# Patient Record
Sex: MALE | Race: ASIAN | Employment: STUDENT | URBAN - METROPOLITAN AREA
[De-identification: names, ages, dates, MRNs, and addresses within clinical notes are randomized per-mention and may not be internally consistent; named-entity substitution may affect disease eponyms.]

---

## 2017-01-06 ENCOUNTER — OFFICE VISIT (OUTPATIENT)
Dept: NEUROLOGY | Facility: CLINIC | Age: 32
End: 2017-01-06

## 2017-01-06 VITALS
SYSTOLIC BLOOD PRESSURE: 120 MMHG | DIASTOLIC BLOOD PRESSURE: 72 MMHG | HEART RATE: 56 BPM | WEIGHT: 141.8 LBS | HEIGHT: 68 IN | BODY MASS INDEX: 21.49 KG/M2

## 2017-01-06 DIAGNOSIS — G40.919 INTRACTABLE SEIZURES (H): ICD-10-CM

## 2017-01-06 DIAGNOSIS — G40.919 INTRACTABLE SEIZURES (H): Primary | ICD-10-CM

## 2017-01-06 RX ORDER — CARBAMAZEPINE 200 MG/1
400 TABLET, EXTENDED RELEASE ORAL 2 TIMES DAILY
Qty: 360 TABLET | Refills: 3 | Status: SHIPPED | OUTPATIENT
Start: 2017-01-06

## 2017-01-06 RX ORDER — LEVETIRACETAM 500 MG/1
500 TABLET ORAL 2 TIMES DAILY
Qty: 180 TABLET | Refills: 3 | Status: SHIPPED | OUTPATIENT
Start: 2017-01-06

## 2017-01-06 RX ORDER — LEVETIRACETAM 500 MG/1
1000 TABLET ORAL 2 TIMES DAILY
Qty: 360 TABLET | Refills: 3 | Status: SHIPPED | OUTPATIENT
Start: 2017-01-06 | End: 2017-01-06

## 2017-01-06 ASSESSMENT — PAIN SCALES - GENERAL: PAINLEVEL: NO PAIN (0)

## 2017-01-06 NOTE — PROGRESS NOTES
"CHIEF COMPLAINT:  Seizure like activities.        HISTORY OF PRESENT ILLNESS:  This patient is a 31-year-old right-handed, French male returns for follow up for partial seizures.  The patient was diagnosed with epilepsy when he was in [place].  He is accompanied by a French  in the clinic today.  Since the last visit about 5 months ago, he continue to have simple partial seizures with feeling \"goose bumps\" on his body, several times a week, but no LOC or black out spells.  He ran out of the tegretol recently.  So he started to take his tegretol he got from [place].  He felt that the \"[place] tegretol\" does not work as well as the \" tegretol\".  He would like to continue US tegretol.  He had VEEG monitoring in May 2016.  \"EEG study was abnormal due to the presence of occasional poorly formed left posterior quadrant epileptiform discharges with maximal electronegativity at O1 and several electrographic seizures with left posterior quadrant maximum were recorded.  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  In addition, patient had several paroxysmal behavioral events of feeling strange and  goose bumps  without clear EEG correlate to diagnose epileptic seizures; these may have represented simple partial seizures without scalp EEG alterations.\"       According to the patient, he started to have his habitual spells when he was 12 years old.  He has only 1 type of spell.  These spells are described as he will have a feeling of something is going to happen.  Then he will have goosebumps all over his body, then he will breathe deeply in and out.  He denies chest pain, denies palpitations or any other symptoms.  He did have 2 occasions that he lost awareness years ago when he was in [place].  These spells last usually from 30 seconds to 2 minutes.  They mostly happened in the early morning when he just woke up.  Now they are happening almost daily.  He is currently taking " "Tegretol 400 mg twice daily.  He has been on Tegretol for almost 7 years, but he felt that Tegretol was not really helping.  He also tried other medications in the past including Keppra, Depakote, Trileptal.  He felt that none of the medications had helped.      He was evaluated in [place] in the past.  He had an MRI scan which showed no abnormalities.  He also had EEG study which showed the EEG captured multiple seizures with tachycardia with lip smacking and partial responsiveness.  Unfortunately, this was done in [place] and we do not have the actual EEG recording for review at this time.  There was 1 report mentioned that his brain MRI was suspicious for right hippocampal atrophy.  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      Triggers are possibly stress and seizures became more frequent since he came to the  for study about 4 years ago.      Risk factors: None.    Current Outpatient Prescriptions   Medication Sig Dispense Refill     levETIRAcetam (KEPPRA) 500 MG tablet Take 2 tablets (1,000 mg) by mouth 2 times daily 360 tablet 3     carBAMazepine (TEGRETOL XR) 200 MG 12 hr tablet Take 2 tablets (400 mg) by mouth 2 times daily 360 tablet 3        PREVIOUS ANTIEPILEPTIC MEDICATIONS:  Keppra, Depakote and Trileptal.       PAST MEDICAL HISTORY:  Perirectal abscess with surgical drainage.      FAMILY HISTORY:  No family history of seizures.      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  He just graduated.  He smokes 1 pack of cigarettes every 4 days.  Drinks alcohol occasionally, about once a week, 2-3 beers each time.  No drug abuse.  He is from [place].      ALLERGIES:  No known drug allergies.      REVIEW OF SYSTEMS:  A 10-point review of systems is essentially negative except for mentioned in HPI.      PHYSICAL EXAMINATION:      Blood pressure 120/72, pulse 56, height 1.72 m (5' 7.7\"), weight 64.32 kg (141 lb 12.8 " "oz).    General exam: General Appearance: No acute distress. HEENT: Normocephalic, atraumatic. Neck: Supple.  Extremities: No edema, no clubbing, no cyanosis.     Neurologic Exam: Alert and oriented x3. Speech fluent, appropriate. Normal attention. Cranial Nerves: Pupils are equal, round, reactive to light and accomodation. Extraocular movement intact. No facial weakness or asymmetry. Hearing normal. Motor Exam: Normal. Coordination:no ataxia.  Gait and Station: normal.       PREVIOUS DIAGNOSTIC TESTING:  He had an MRI scan in the past in [place] which was reported negative.  However, there were some comments of the possibility of possible right hippocampal sclerosis.      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      IMPRESSION:   1.  Epilepsy.  This patient is a 31-year-old right-handed, Greek male returns for follow up for partial seizures.  The patient was diagnosed with epilepsy when he was in [place].  He is accompanied by a Greek  in the clinic today.  Since the last visit about 5 months ago, he continue to have simple partial seizures with feeling \"goose bumps\" on his body, several times a week, but no LOC or black out spells.  He ran out of the tegretol recently.  So he started to take his tegretol he got from [place].  He felt that the \"[place] tegretol\" does not work as well as the \" tegretol\".  He would like to continue US tegretol.  He had VEEG monitoring in May 2016.  \"EEG study was abnormal due to the presence of occasional poorly formed left posterior quadrant epileptiform discharges with maximal electronegativity at O1 and several electrographic seizures with left posterior quadrant maximum were recorded.  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  In addition, patient had several paroxysmal behavioral events of feeling strange and  goose bumps  without clear EEG correlate to " "diagnose epileptic seizures; these may have represented simple partial seizures without scalp EEG alterations.\"       2. Insomnia.  Improved.     PLAN:   1. Continue Tegretol 400 mg twice daily and Keppra 1000mg bid.   2. Tegretol and keppra levels.  3. Return to clinic in 6 months.    40 min was spent on the visit.  Over 50% of the time was spent on education, counseling about optimal seizure control and coordination of care.    "

## 2017-01-06 NOTE — MR AVS SNAPSHOT
After Visit Summary   1/6/2017    Jeff Wesley    MRN: [id number]           Patient Information     Date Of Birth          1985        Visit Information        Provider Department      1/6/2017 10:15 AM Mark Marrero MD; [employer] [facility]        Today's Diagnoses     Intractable seizures (H)    -  1        Follow-ups after your visit        Follow-up notes from your care team     Return in about 6 months (around 7/6/2017).      Your next 10 appointments already scheduled     Jan 06, 2017 11:45 AM   LAB with  LAB   [facility])    [address] [place] [id number] [phone]           Patient must bring picture ID.  Patient should be prepared to give a urine specimen  Please do not eat 10-12 hours before your appointment if you are coming in fasting for labs on lipids, cholesterol, or glucose (sugar).  Pregnant women should follow their Care Team instructions. Water with medications is okay. Do not drink coffee or other fluids.   If you have concerns about taking  your medications, please ask at office or if scheduling via [facility], send a message by clicking on Secure Messaging, Message Your Care Team.            Jul 12, 2017  3:20 PM   (Arrive by 3:05 PM)   Return Seizure with Mark Marrero MD   [facility] ([facility])    [address] [place] [id number] [phone]              Future tests that were ordered for you today     Open Future Orders        Priority Expected Expires Ordered    Carbamazepine and epoxide free and total Routine  1/6/2018 1/6/2017    Levetiracetam level Routine  1/6/2018 1/6/2017            Who to contact     Please call your clinic at [phone] to:    Ask questions about your health    Make or cancel appointments    Discuss your medicines    Learn about your test results    Speak to your doctor   If you have  "compliments or concerns about an experience at your clinic, or if you wish to file a complaint, please contact [facility] Patient Relations at [phone] or email us at [email or web address]         Additional Information About Your Visit        MyChart Information     [employer] gives you secure access to your electronic health record. If you see a primary care provider, you can also send messages to your care team and make appointments. If you have questions, please call your primary care clinic.  If you do not have a primary care provider, please call [phone] and they will assist you.      [employer] is an electronic gateway that provides easy, online access to your medical records. With [employer], you can request a clinic appointment, read your test results, renew a prescription or communicate with your care team.     To access your existing account, please contact your [facility] or call [phone] for assistance.        Care EveryWhere ID     This is your Care EveryWhere ID. This could be used by other organizations to access your [place] medical records  [id number]        Your Vitals Were     Pulse Height BMI (Body Mass Index)             56 1.72 m (5' 7.7\") 21.74 kg/m2          Blood Pressure from Last 3 Encounters:   01/06/17 120/72   08/05/16 107/65   07/08/16 108/68    Weight from Last 3 Encounters:   01/06/17 64.32 kg (141 lb 12.8 oz)   08/05/16 62.143 kg (137 lb)   04/15/16 63.685 kg (140 lb 6.4 oz)                 Where to get your medicines      These medications were sent to [facility] [place], [place] - [address] [facility] [address]  [address] [place] [address] [address] [address]    Hours:  TRANSPLANT PHONE NUMBER [phone] Phone:  [phone]    - carBAMazepine 200 MG 12 hr tablet  - levETIRAcetam 500 MG tablet       Primary Care Provider Office Phone # Fax #    Freddie Schmitt [phone] " [phone]       [address]  [facility] [address]        Thank you!     Thank you for choosing [facility]  for your care. Our goal is always to provide you with excellent care. Hearing back from our patients is one way we can continue to improve our services. Please take a few minutes to complete the written survey that you may receive in the mail after your visit with us. Thank you!             Your Updated Medication List - Protect others around you: Learn how to safely use, store and throw away your medicines at www.disposemymeds.org.          This list is accurate as of: 1/6/17 11:17 AM.  Always use your most recent med list.                   Brand Name Dispense Instructions for use    carBAMazepine 200 MG 12 hr tablet    TEGretol XR    360 tablet    Take 2 tablets (400 mg) by mouth 2 times daily       levETIRAcetam 500 MG tablet    KEPPRA    360 tablet    Take 2 tablets (1,000 mg) by mouth 2 times daily

## 2017-01-06 NOTE — Clinical Note
"1/6/2017       RE: Jeff Wesley  [address]  [facility] [address]     Dear Colleague,    Thank you for referring your patient, Jeff Wesley, to the [facility] at [facility]. Please see a copy of my visit note below.    CHIEF COMPLAINT:  Seizure like activities.        HISTORY OF PRESENT ILLNESS:  This patient is a 31-year-old right-handed, Occitan male returns for follow up for partial seizures.  The patient was diagnosed with epilepsy when he was in [place].  He is accompanied by a Occitan  in the clinic today.  Since the last visit about 5 months ago, he continue to have simple partial seizures with feeling \"goose bumps\" on his body, several times a week, but no LOC or black out spells.  He ran out of the tegretol recently.  So he started to take his tegretol he got from [place].  He felt that the \"[place] tegretol\" does not work as well as the \" tegretol\".  He would like to continue US tegretol.  He had VEEG monitoring in May 2016.  \"EEG study was abnormal due to the presence of occasional poorly formed left posterior quadrant epileptiform discharges with maximal electronegativity at O1 and several electrographic seizures with left posterior quadrant maximum were recorded.  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  In addition, patient had several paroxysmal behavioral events of feeling strange and  goose bumps  without clear EEG correlate to diagnose epileptic seizures; these may have represented simple partial seizures without scalp EEG alterations.\"       According to the patient, he started to have his habitual spells when he was 12 years old.  He has only 1 type of spell.  These spells are described as he will have a feeling of something is going to happen.  Then he will have goosebumps all over his body, then he will breathe deeply in and out.  He denies chest pain, denies palpitations or any other " symptoms.  He did have 2 occasions that he lost awareness years ago when he was in [place].  These spells last usually from 30 seconds to 2 minutes.  They mostly happened in the early morning when he just woke up.  Now they are happening almost daily.  He is currently taking Tegretol 400 mg twice daily.  He has been on Tegretol for almost 7 years, but he felt that Tegretol was not really helping.  He also tried other medications in the past including Keppra, Depakote, Trileptal.  He felt that none of the medications had helped.      He was evaluated in [place] in the past.  He had an MRI scan which showed no abnormalities.  He also had EEG study which showed the EEG captured multiple seizures with tachycardia with lip smacking and partial responsiveness.  Unfortunately, this was done in [place] and we do not have the actual EEG recording for review at this time.  There was 1 report mentioned that his brain MRI was suspicious for right hippocampal atrophy.  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      Triggers are possibly stress and seizures became more frequent since he came to the  for study about 4 years ago.      Risk factors: None.    Current Outpatient Prescriptions   Medication Sig Dispense Refill     levETIRAcetam (KEPPRA) 500 MG tablet Take 2 tablets (1,000 mg) by mouth 2 times daily 360 tablet 3     carBAMazepine (TEGRETOL XR) 200 MG 12 hr tablet Take 2 tablets (400 mg) by mouth 2 times daily 360 tablet 3        PREVIOUS ANTIEPILEPTIC MEDICATIONS:  Keppra, Depakote and Trileptal.       PAST MEDICAL HISTORY:  Perirectal abscess with surgical drainage.      FAMILY HISTORY:  No family history of seizures.      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  He just graduated.  He smokes 1 pack of cigarettes every 4 days.  Drinks alcohol occasionally, about once a week, 2-3 beers each time.  No drug abuse.  He is from  [place]      ALLERGIES:  No known drug allergies.      REVIEW OF SYSTEMS:  A 10-point review of systems is essentially negative except for mentioned in HPI.      PHYSICAL EXAMINATION:      Blood pressure 120/72, pulse 56, height 1.72 m (5' 7.7\"), weight 64.32 kg (141 lb 12.8 oz).    General exam: General Appearance: No acute distress. HEENT: Normocephalic, atraumatic. Neck: Supple.  Extremities: No edema, no clubbing, no cyanosis.     Neurologic Exam: Alert and oriented x3. Speech fluent, appropriate. Normal attention. Cranial Nerves: Pupils are equal, round, reactive to light and accomodation. Extraocular movement intact. No facial weakness or asymmetry. Hearing normal. Motor Exam: Normal. Coordination:no ataxia.  Gait and Station: normal.       PREVIOUS DIAGNOSTIC TESTING:  He had an MRI scan in the past in [place] which was reported negative.  However, there were some comments of the possibility of possible right hippocampal sclerosis.      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      IMPRESSION:   1.  Epilepsy.  This patient is a 31-year-old right-handed, English male returns for follow up for partial seizures.  The patient was diagnosed with epilepsy when he was in [place].  He is accompanied by a English  in the clinic today.  Since the last visit about 5 months ago, he continue to have simple partial seizures with feeling \"goose bumps\" on his body, several times a week, but no LOC or black out spells.  He ran out of the tegretol recently.  So he started to take his tegretol he got from [place].  He felt that the \"[place] tegretol\" does not work as well as the \" tegretol\".  He would like to continue US tegretol.  He had VEEG monitoring in May 2016.  \"EEG study was abnormal due to the presence of occasional poorly formed left posterior quadrant epileptiform discharges with maximal electronegativity at O1 and several electrographic " "seizures with left posterior quadrant maximum were recorded.  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  In addition, patient had several paroxysmal behavioral events of feeling strange and  goose bumps  without clear EEG correlate to diagnose epileptic seizures; these may have represented simple partial seizures without scalp EEG alterations.\"       2. Insomnia.  Improved.     PLAN:   1. Continue Tegretol 400 mg twice daily and Keppra 1000mg bid.   2. Tegretol and keppra levels.  3. Return to clinic in 6 months.    40 min was spent on the visit.  Over 50% of the time was spent on education, counseling about optimal seizure control and coordination of care.        Again, thank you for allowing me to participate in the care of your patient.      Sincerely,    Mark Marrero MD      "

## 2017-01-09 LAB
CARBAMAZEPINE EP FREE SERPL-MCNC: 0.5 UG/ML
CARBAMAZEPINE EP SERPL-MCNC: 1.1 UG/ML
CARBAMAZEPINE FREE SERPL-MCNC: 2 UG/ML
CARBAMAZEPINE SERPL-MCNC: 8.7 UG/ML
LEVETIRACETAM SERPL-MCNC: ABNORMAL UG/ML

## 2020-03-02 ENCOUNTER — HEALTH MAINTENANCE LETTER (OUTPATIENT)
Age: 35
End: 2020-03-02

## 2020-12-20 ENCOUNTER — HEALTH MAINTENANCE LETTER (OUTPATIENT)
Age: 35
End: 2020-12-20

## 2021-04-24 ENCOUNTER — HEALTH MAINTENANCE LETTER (OUTPATIENT)
Age: 36
End: 2021-04-24

## 2021-10-03 ENCOUNTER — HEALTH MAINTENANCE LETTER (OUTPATIENT)
Age: 36
End: 2021-10-03

## 2022-05-15 ENCOUNTER — HEALTH MAINTENANCE LETTER (OUTPATIENT)
Age: 37
End: 2022-05-15

## 2022-09-10 ENCOUNTER — HEALTH MAINTENANCE LETTER (OUTPATIENT)
Age: 37
End: 2022-09-10

## 2023-06-03 ENCOUNTER — HEALTH MAINTENANCE LETTER (OUTPATIENT)
Age: 38
End: 2023-06-03